# Patient Record
Sex: MALE | Race: ASIAN | NOT HISPANIC OR LATINO | Employment: STUDENT | URBAN - METROPOLITAN AREA
[De-identification: names, ages, dates, MRNs, and addresses within clinical notes are randomized per-mention and may not be internally consistent; named-entity substitution may affect disease eponyms.]

---
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
## 2022-08-12 ENCOUNTER — OFFICE VISIT (OUTPATIENT)
Dept: FAMILY MEDICINE | Facility: CLINIC | Age: 22
End: 2022-08-12
Payer: COMMERCIAL

## 2022-08-12 VITALS
TEMPERATURE: 97.4 F | DIASTOLIC BLOOD PRESSURE: 68 MMHG | SYSTOLIC BLOOD PRESSURE: 110 MMHG | RESPIRATION RATE: 14 BRPM | BODY MASS INDEX: 21.24 KG/M2 | WEIGHT: 124.4 LBS | OXYGEN SATURATION: 98 % | HEART RATE: 70 BPM | HEIGHT: 64 IN

## 2022-08-12 DIAGNOSIS — Z00.00 HEALTHCARE MAINTENANCE: Primary | ICD-10-CM

## 2022-08-12 LAB — HGB BLD-MCNC: 14.9 G/DL (ref 13.3–17.7)

## 2022-08-12 PROCEDURE — 83721 ASSAY OF BLOOD LIPOPROTEIN: CPT | Performed by: FAMILY MEDICINE

## 2022-08-12 PROCEDURE — 99395 PREV VISIT EST AGE 18-39: CPT | Performed by: FAMILY MEDICINE

## 2022-08-12 PROCEDURE — 36415 COLL VENOUS BLD VENIPUNCTURE: CPT | Performed by: FAMILY MEDICINE

## 2022-08-12 PROCEDURE — 82565 ASSAY OF CREATININE: CPT | Performed by: FAMILY MEDICINE

## 2022-08-12 PROCEDURE — 82947 ASSAY GLUCOSE BLOOD QUANT: CPT | Performed by: FAMILY MEDICINE

## 2022-08-12 PROCEDURE — 85018 HEMOGLOBIN: CPT | Performed by: FAMILY MEDICINE

## 2022-08-12 SDOH — ECONOMIC STABILITY: INCOME INSECURITY: IN THE LAST 12 MONTHS, WAS THERE A TIME WHEN YOU WERE NOT ABLE TO PAY THE MORTGAGE OR RENT ON TIME?: NO

## 2022-08-12 ASSESSMENT — PAIN SCALES - GENERAL: PAINLEVEL: NO PAIN (0)

## 2022-08-12 NOTE — PROGRESS NOTES
"SUBJECTIVE:   CC: Jamarcus Childress is an 21 year old male who presents for preventative health visit.     {Split Bill scripting  The purpose of this visit is to discuss your medical history and prevent health problems before you are sick. You may be responsible for a co-pay, coinsurance, or deductible if your visit today includes services such as checking on a sore throat, having an x-ray or lab test, or treating and evaluating a new or existing condition :580359}  {Patient advised of split billing (Optional):154680}  HPI  {Add if <65 person on Medicare  - Required Questions (Optional):362733}  {Outside tests to abstract? :233924}    {additional problems to add (Optional):399536}    Today's PHQ-2 Score:   PHQ-2 ( 1999 Pfizer) 4/9/2021   Q1: Little interest or pleasure in doing things 0   Q2: Feeling down, depressed or hopeless 0   PHQ-2 Score 0   PHQ-2 Total Score (12-17 Years)- Positive if 3 or more points; Administer PHQ-A if positive 0       Abuse: Current or Past(Physical, Sexual or Emotional)- { :653010}  Do you feel safe in your environment? { :443050}        Social History     Tobacco Use     Smoking status: Never Smoker     Smokeless tobacco: Never Used   Substance Use Topics     Alcohol use: No     Alcohol/week: 0.0 standard drinks     {Rooming Staff- Complete this question if Prescreen response is not shown below for today's visit. If you drink alcohol do you typically have >3 drinks per day or >7 drinks per week? (Optional):431584}    No flowsheet data found.{add AUDIT responses (Optional) (A score of 7 for adult men is an indication of hazardous drinking; a score of 8 or more is an indication of an alcohol use disorder.  A score of 7 or more for adult women is an indication of hazardous drinking or an alchohol use disorder):565135}    Last PSA: No results found for: PSA    Reviewed orders with patient. Reviewed health maintenance and updated orders accordingly - { :470295::\"Yes\"}  {Chronicprobdata " "(optional):568145}    Reviewed and updated as needed this visit by clinical staff                    Reviewed and updated as needed this visit by Provider                   {HISTORY OPTIONS (Optional):207216}    Review of Systems  {MALE ROS (Optional):462618::\"CONSTITUTIONAL: NEGATIVE for fever, chills, change in weight\",\"INTEGUMENTARY/SKIN: NEGATIVE for worrisome rashes, moles or lesions\",\"EYES: NEGATIVE for vision changes or irritation\",\"ENT: NEGATIVE for ear, mouth and throat problems\",\"RESP: NEGATIVE for significant cough or SOB\",\"CV: NEGATIVE for chest pain, palpitations or peripheral edema\",\"GI: NEGATIVE for nausea, abdominal pain, heartburn, or change in bowel habits\",\" male: negative for dysuria, hematuria, decreased urinary stream, erectile dysfunction, urethral discharge\",\"MUSCULOSKELETAL: NEGATIVE for significant arthralgias or myalgia\",\"NEURO: NEGATIVE for weakness, dizziness or paresthesias\",\"PSYCHIATRIC: NEGATIVE for changes in mood or affect\"}    OBJECTIVE:   There were no vitals taken for this visit.    Physical Exam  {Exam Choices (Optional):590380}    {Diagnostic Test Results (Optional):975610::\"Diagnostic Test Results:\",\"Labs reviewed in Epic\"}    ASSESSMENT/PLAN:   {Diag Picklist:793545}    {Patient advised of split billing (Optional):900133}    COUNSELING:   {MALE COUNSELING MESSAGES:445009::\"Reviewed preventive health counseling, as reflected in patient instructions\"}    Estimated body mass index is 21.01 kg/m  as calculated from the following:    Height as of 4/9/21: 1.626 m (5' 4\").    Weight as of 4/9/21: 55.5 kg (122 lb 6.4 oz).     {Weight Management Plan (ACO) Complete if BMI is abnormal-  Ages 18-64  BMI >24.9.  Age 65+ with BMI <23 or >30 (Optional):468191}    He reports that he has never smoked. He has never used smokeless tobacco.      Counseling Resources:  ATP IV Guidelines  Pooled Cohorts Equation Calculator  FRAX Risk Assessment  ICSI Preventive Guidelines  Dietary Guidelines " for Americans, 2010  USDA's MyPlate  ASA Prophylaxis  Lung CA Screening    Amish Hill MD  [facility]

## 2022-08-12 NOTE — LETTER
August 15, 2022      Jamarcus Childress  [address] [facility] [address]        Dear ,    We are writing to inform you of your test results.    You maybe able to check the lab results via [employer], it showed your lab results including LDL cholesterol/glucose to rule out diabetes/creatinine for renal function/Hemoglobin to rule out anemia were all normal.   Please stay on top of your current health status and recheck fasting lab at your next physical exam.       Resulted Orders   Hemoglobin   Result Value Ref Range    Hemoglobin 14.9 13.3 - 17.7 g/dL   Glucose   Result Value Ref Range    Glucose 82 70 - 99 mg/dL    Patient Fasting > 8hrs? No    Creatinine   Result Value Ref Range    Creatinine 0.74 0.50 - 1.00 mg/dL    GFR Estimate >90 >60 mL/min/1.73m2      Comment:      Effective December 21, 2021 eGFRcr in adults is calculated using the 2021 CKD-EPI creatinine equation which includes age and gender (Alvin et al., NEJ, DOI: 10.1056/ILPCxu1471779)   LDL cholesterol direct   Result Value Ref Range    LDL Cholesterol Direct 109 <110 mg/dL      Comment:      Age 2-19 years:  Desirable: 0-110 mg/dL   Borderline high: 110-129 mg/dL   High: >= 130 mg/dL    Age 20 years and older:  Desirable: <100mg/dL  Above desirable: 100-129 mg/dL   Borderline high: 130-159 mg/dL   High: 160-189 mg/dL   Very high: >= 190 mg/dL       If you have any questions or concerns, please call the clinic at the number listed above.       Sincerely,      Amish Hill MD

## 2022-08-12 NOTE — PROGRESS NOTES
SUBJECTIVE:   CC: Jamarcus Childress is an 21 year old male who presents for preventative health visit.       Patient has been advised of split billing requirements and indicates understanding: Yes  Healthy Habits:   PHQ-2 Total Score: 0      Today's PHQ-2 Score:   PHQ-2 ( 1999 Pfizer) 8/12/2022   Q1: Little interest or pleasure in doing things 0   Q2: Feeling down, depressed or hopeless 0   PHQ-2 Score 0   PHQ-2 Total Score (12-17 Years)- Positive if 3 or more points; Administer PHQ-A if positive -   Q1: Little interest or pleasure in doing things Not at all   Q2: Feeling down, depressed or hopeless Not at all   PHQ-2 Score 0       Abuse: Current or Past(Physical, Sexual or Emotional)- No  Do you feel safe in your environment? Yes        Social History     Tobacco Use     Smoking status: Never Smoker     Smokeless tobacco: Never Used   Substance Use Topics     Alcohol use: No     Alcohol/week: 0.0 standard drinks     If you drink alcohol do you typically have >3 drinks per day or >7 drinks per week? No    No flowsheet data found.    Last PSA: No results found for: PSA    Reviewed orders with patient. Reviewed health maintenance and updated orders accordingly - Yes  BP Readings from Last 3 Encounters:   08/12/22 110/68   04/09/21 110/66   08/28/19 94/54 (4 %, Z = -1.75 /  18 %, Z = -0.92)*     *BP percentiles are based on the 2017 AAP Clinical Practice Guideline for boys    Wt Readings from Last 3 Encounters:   08/12/22 56.4 kg (124 lb 6.4 oz) (6 %, Z= -1.54)*   04/09/21 55.5 kg (122 lb 6.4 oz) (7 %, Z= -1.45)*   08/28/19 52.2 kg (115 lb) (8 %, Z= -1.41)*     * Growth percentiles are based on CDC (Boys, 2-20 Years) data.                  There is no problem list on file for this patient.    No past surgical history on file.    Social History     Tobacco Use     Smoking status: Never Smoker     Smokeless tobacco: Never Used   Substance Use Topics     Alcohol use: No     Alcohol/week: 0.0 standard drinks     Family History  "  Problem Relation Age of Onset     Family History Negative Mother      Family History Negative Father          Current Outpatient Medications   Medication Sig Dispense Refill     EPINEPHrine (EPIPEN/ADRENACLICK/OR ANY BX GENERIC EQUIV) 0.3 MG/0.3ML injection 2-pack Inject 0.3 mg into the muscle       Allergies   Allergen Reactions     Unknown [No Clinical Screening - See Comments] Rash     Recent Labs   Lab Test 04/09/21  1343   *        Reviewed and updated as needed this visit by clinical staff                    Reviewed and updated as needed this visit by Provider                   No past medical history on file.   No past surgical history on file.    Review of Systems  CONSTITUTIONAL: NEGATIVE for fever, chills, change in weight  INTEGUMENTARY/SKIN: NEGATIVE for worrisome rashes, moles or lesions  EYES: NEGATIVE for vision changes or irritation  ENT: NEGATIVE for ear, mouth and throat problems  RESP: NEGATIVE for significant cough or SOB  CV: NEGATIVE for chest pain, palpitations or peripheral edema  GI: NEGATIVE for nausea, abdominal pain, heartburn, or change in bowel habits   male: negative for dysuria, hematuria, decreased urinary stream, erectile dysfunction, urethral discharge  MUSCULOSKELETAL: NEGATIVE for significant arthralgias or myalgia  NEURO: NEGATIVE for weakness, dizziness or paresthesias  PSYCHIATRIC: NEGATIVE for changes in mood or affect    OBJECTIVE:   /68   Pulse 70   Temp 97.4  F (36.3  C)   Resp 14   Ht 1.635 m (5' 4.37\")   Wt 56.4 kg (124 lb 6.4 oz)   SpO2 98%   BMI 21.11 kg/m      Physical Exam  GENERAL: healthy, alert and no distress  EYES: Eyes grossly normal to inspection, PERRL and conjunctivae and sclerae normal  HENT: ear canals and TM's normal, nose and mouth without ulcers or lesions  NECK: no adenopathy, no asymmetry, masses, or scars and thyroid normal to palpation  RESP: lungs clear to auscultation - no rales, rhonchi or wheezes  CV: regular rate and " "rhythm, normal S1 S2, no S3 or S4, no murmur, click or rub, no peripheral edema and peripheral pulses strong  ABDOMEN: soft, nontender, no hepatosplenomegaly, no masses and bowel sounds normal  MS: no gross musculoskeletal defects noted, no edema  SKIN: no suspicious lesions or rashes  NEURO: Normal strength and tone, mentation intact and speech normal  BACK: no CVA tenderness, no paralumbar tenderness  PSYCH: mentation appears normal, affect normal/bright  LYMPH: no cervical, supraclavicular, axillary, or inguinal adenopathy        ASSESSMENT/PLAN:   (Z00.00) Healthcare maintenance  (primary encounter diagnosis)  Plan: Hemoglobin, Glucose, LDL cholesterol direct,         Creatinine              Patient has been advised of split billing requirements and indicates understanding: Yes    COUNSELING:   Reviewed preventive health counseling, as reflected in patient instructions    Estimated body mass index is 21.01 kg/m  as calculated from the following:    Height as of 4/9/21: 1.626 m (5' 4\").    Weight as of 4/9/21: 55.5 kg (122 lb 6.4 oz).         He reports that he has never smoked. He has never used smokeless tobacco.      Counseling Resources:  ATP IV Guidelines  Pooled Cohorts Equation Calculator  FRAX Risk Assessment  ICSI Preventive Guidelines  Dietary Guidelines for Americans, 2010  USDA's MyPlate  ASA Prophylaxis  Lung CA Screening    Amish Hill MD  [facility]  "

## 2022-08-13 LAB
CREAT SERPL-MCNC: 0.74 MG/DL (ref 0.5–1)
FASTING STATUS PATIENT QL REPORTED: NO
GFR SERPL CREATININE-BSD FRML MDRD: >90 ML/MIN/1.73M2
GLUCOSE BLD-MCNC: 82 MG/DL (ref 70–99)

## 2022-08-14 LAB — LDLC SERPL DIRECT ASSAY-MCNC: 109 MG/DL

## 2023-07-13 ENCOUNTER — PATIENT OUTREACH (OUTPATIENT)
Dept: CARE COORDINATION | Facility: CLINIC | Age: 23
End: 2023-07-13
Payer: COMMERCIAL

## 2023-07-27 ENCOUNTER — PATIENT OUTREACH (OUTPATIENT)
Dept: CARE COORDINATION | Facility: CLINIC | Age: 23
End: 2023-07-27
Payer: COMMERCIAL

## 2023-08-25 ENCOUNTER — OFFICE VISIT (OUTPATIENT)
Dept: FAMILY MEDICINE | Facility: CLINIC | Age: 23
End: 2023-08-25
Payer: COMMERCIAL

## 2023-08-25 VITALS
OXYGEN SATURATION: 99 % | HEIGHT: 64 IN | SYSTOLIC BLOOD PRESSURE: 100 MMHG | HEART RATE: 88 BPM | DIASTOLIC BLOOD PRESSURE: 60 MMHG | BODY MASS INDEX: 20.85 KG/M2 | RESPIRATION RATE: 16 BRPM | WEIGHT: 122.13 LBS | TEMPERATURE: 97.5 F

## 2023-08-25 DIAGNOSIS — Z00.00 HEALTH MAINTENANCE EXAMINATION: Primary | ICD-10-CM

## 2023-08-25 LAB
FASTING STATUS PATIENT QL REPORTED: NO
GLUCOSE SERPL-MCNC: 98 MG/DL (ref 70–99)
HGB BLD-MCNC: 15.9 G/DL (ref 13.3–17.7)
LDLC SERPL DIRECT ASSAY-MCNC: 112 MG/DL

## 2023-08-25 PROCEDURE — 83721 ASSAY OF BLOOD LIPOPROTEIN: CPT | Performed by: FAMILY MEDICINE

## 2023-08-25 PROCEDURE — 36415 COLL VENOUS BLD VENIPUNCTURE: CPT | Performed by: FAMILY MEDICINE

## 2023-08-25 PROCEDURE — 99395 PREV VISIT EST AGE 18-39: CPT | Performed by: FAMILY MEDICINE

## 2023-08-25 PROCEDURE — 82947 ASSAY GLUCOSE BLOOD QUANT: CPT | Performed by: FAMILY MEDICINE

## 2023-08-25 PROCEDURE — 85018 HEMOGLOBIN: CPT | Performed by: FAMILY MEDICINE

## 2023-08-25 RX ORDER — EPINEPHRINE 0.3 MG/.3ML
0.3 INJECTION SUBCUTANEOUS PRN
Qty: 2 EACH | Status: CANCELLED | OUTPATIENT
Start: 2023-08-25

## 2023-08-25 ASSESSMENT — ENCOUNTER SYMPTOMS
CHILLS: 0
NERVOUS/ANXIOUS: 0
EYE PAIN: 0
WEAKNESS: 0
DIZZINESS: 0
JOINT SWELLING: 0
HEADACHES: 0
CONSTIPATION: 0
SORE THROAT: 0
COUGH: 0
PALPITATIONS: 0
HEARTBURN: 0
HEMATOCHEZIA: 0
DIARRHEA: 0
HEMATURIA: 0
FEVER: 0
SHORTNESS OF BREATH: 0
ARTHRALGIAS: 0
NAUSEA: 0
MYALGIAS: 0
ABDOMINAL PAIN: 0
FREQUENCY: 0
PARESTHESIAS: 0
DYSURIA: 0

## 2023-08-25 ASSESSMENT — PAIN SCALES - GENERAL: PAINLEVEL: NO PAIN (0)

## 2023-08-25 NOTE — LETTER
August 28, 2023      Jamarcus Childress  [address] [facility] [address]        Dear ,    We are writing to inform you of your test results.    Your glucose to rule out diabetes and Hemoglobin to rule out anemia are all normal. However, your LDL is slightly elevated. Please work on life style modification including low fat/carb diet with regular exercise.       Resulted Orders   Hemoglobin   Result Value Ref Range    Hemoglobin 15.9 13.3 - 17.7 g/dL   Glucose   Result Value Ref Range    Glucose 98 70 - 99 mg/dL    Patient Fasting > 8hrs? No    LDL cholesterol direct   Result Value Ref Range    LDL Cholesterol Direct 112 (H) <100 mg/dL      Comment:      Age 2-19 years:  Desirable: 0-110 mg/dL   Borderline high: 110-129 mg/dL   High: >= 130 mg/dL    Age 20 years and older:  Desirable: <100mg/dL  Above desirable: 100-129 mg/dL   Borderline high: 130-159 mg/dL   High: 160-189 mg/dL   Very high: >= 190 mg/dL       If you have any questions or concerns, please call the clinic at the number listed above.       Sincerely,      Amish Hill MD

## 2023-08-25 NOTE — PROGRESS NOTES
SUBJECTIVE:   CC: Jamarcus is an 22 year old who presents for preventative health visit.       8/25/2023    12:54 PM   Additional Questions   Roomed by Nathalia TRACEY       Healthy Habits:     Getting at least 3 servings of Calcium per day:  Yes    Bi-annual eye exam:  Yes    Dental care twice a year:  Yes    Sleep apnea or symptoms of sleep apnea:  None    Diet:  Regular (no restrictions)    Frequency of exercise:  1 day/week    Duration of exercise:  15-30 minutes    Taking medications regularly:  Yes    Medication side effects:  Not applicable    Additional concerns today:  No      Today's PHQ-2 Score:       8/25/2023    12:47 PM   PHQ-2 ( 1999 Pfizer)   Q1: Little interest or pleasure in doing things 0   Q2: Feeling down, depressed or hopeless 0   PHQ-2 Score 0   Q1: Little interest or pleasure in doing things Not at all   Q2: Feeling down, depressed or hopeless Not at all   PHQ-2 Score 0         Social History     Tobacco Use    Smoking status: Never    Smokeless tobacco: Never   Substance Use Topics    Alcohol use: No     Alcohol/week: 0.0 standard drinks of alcohol             8/25/2023    12:47 PM   Alcohol Use   Prescreen: >3 drinks/day or >7 drinks/week? No          No data to display                Last PSA: No results found for: PSA    Reviewed orders with patient. Reviewed health maintenance and updated orders accordingly - Yes  BP Readings from Last 3 Encounters:   08/25/23 100/60   08/12/22 110/68   04/09/21 110/66    Wt Readings from Last 3 Encounters:   08/25/23 55.4 kg (122 lb 2 oz)   08/12/22 56.4 kg (124 lb 6.4 oz) (6 %, Z= -1.54)*   04/09/21 55.5 kg (122 lb 6.4 oz) (7 %, Z= -1.45)*     * Growth percentiles are based on CDC (Boys, 2-20 Years) data.                  There is no problem list on file for this patient.    No past surgical history on file.    Social History     Tobacco Use    Smoking status: Never    Smokeless tobacco: Never   Substance Use Topics    Alcohol use: No     Alcohol/week: 0.0 standard  "drinks of alcohol     Family History   Problem Relation Age of Onset    Family History Negative Mother     Family History Negative Father          No current outpatient medications on file.     Allergies   Allergen Reactions    Unknown [No Clinical Screening - See Comments] Rash     Recent Labs   Lab Test 08/12/22  1311 04/09/21  1343    121*   CR 0.74  --    GFRESTIMATED >90  --         Reviewed and updated as needed this visit by clinical staff   Tobacco   Meds              Reviewed and updated as needed this visit by Provider                 No past medical history on file.   No past surgical history on file.    Review of Systems   Constitutional:  Negative for chills and fever.   HENT:  Negative for congestion, ear pain, hearing loss and sore throat.    Eyes:  Negative for pain and visual disturbance.   Respiratory:  Negative for cough and shortness of breath.    Cardiovascular:  Negative for chest pain, palpitations and peripheral edema.   Gastrointestinal:  Negative for abdominal pain, constipation, diarrhea, heartburn, hematochezia and nausea.   Genitourinary:  Negative for dysuria, frequency, genital sores, hematuria, impotence, penile discharge and urgency.   Musculoskeletal:  Negative for arthralgias, joint swelling and myalgias.   Skin:  Negative for rash.   Neurological:  Negative for dizziness, weakness, headaches and paresthesias.   Psychiatric/Behavioral:  Negative for mood changes. The patient is not nervous/anxious.          OBJECTIVE:   /60 (BP Location: Right arm, Patient Position: Sitting, Cuff Size: Adult Regular)   Pulse 88   Temp 97.5  F (36.4  C) (Temporal)   Resp 16   Ht 1.636 m (5' 4.41\")   Wt 55.4 kg (122 lb 2 oz)   SpO2 99%   BMI 20.70 kg/m      Physical Exam  GENERAL: healthy, alert and no distress  EYES: Eyes grossly normal to inspection, PERRL and conjunctivae and sclerae normal  HENT: ear canals and TM's normal, nose and mouth without ulcers or lesions  NECK: no " adenopathy, no asymmetry, masses, or scars and thyroid normal to palpation  RESP: lungs clear to auscultation - no rales, rhonchi or wheezes  CV: regular rate and rhythm, normal S1 S2, no S3 or S4, no murmur, click or rub, no peripheral edema and peripheral pulses strong  ABDOMEN: soft, nontender, no hepatosplenomegaly, no masses and bowel sounds normal  MS: no gross musculoskeletal defects noted, no edema  SKIN: no suspicious lesions or rashes  NEURO: Normal strength and tone, mentation intact and speech normal  BACK: no CVA tenderness, no paralumbar tenderness        ASSESSMENT/PLAN:       ICD-10-CM    1. Health maintenance examination  Z00.00 Hemoglobin     Glucose     LDL cholesterol direct          Patient has been advised of split billing requirements and indicates understanding: Yes      COUNSELING:   Reviewed preventive health counseling, as reflected in patient instructions        He reports that he has never smoked. He has never used smokeless tobacco.            Amish Hill MD  [facility]

## 2024-08-26 ENCOUNTER — OFFICE VISIT (OUTPATIENT)
Dept: FAMILY MEDICINE | Facility: CLINIC | Age: 24
End: 2024-08-26
Payer: COMMERCIAL

## 2024-08-26 VITALS
TEMPERATURE: 97.5 F | OXYGEN SATURATION: 97 % | HEIGHT: 64 IN | HEART RATE: 89 BPM | SYSTOLIC BLOOD PRESSURE: 118 MMHG | RESPIRATION RATE: 14 BRPM | BODY MASS INDEX: 21.51 KG/M2 | WEIGHT: 126 LBS | DIASTOLIC BLOOD PRESSURE: 82 MMHG

## 2024-08-26 DIAGNOSIS — Z00.00 HEALTH MAINTENANCE EXAMINATION: Primary | ICD-10-CM

## 2024-08-26 LAB
FASTING STATUS PATIENT QL REPORTED: YES
GLUCOSE SERPL-MCNC: 98 MG/DL (ref 70–99)
HGB BLD-MCNC: 15.4 G/DL (ref 13.3–17.7)
LDLC SERPL DIRECT ASSAY-MCNC: 119 MG/DL

## 2024-08-26 PROCEDURE — 90472 IMMUNIZATION ADMIN EACH ADD: CPT | Performed by: FAMILY MEDICINE

## 2024-08-26 PROCEDURE — 36415 COLL VENOUS BLD VENIPUNCTURE: CPT | Performed by: FAMILY MEDICINE

## 2024-08-26 PROCEDURE — 90715 TDAP VACCINE 7 YRS/> IM: CPT | Performed by: FAMILY MEDICINE

## 2024-08-26 PROCEDURE — 90471 IMMUNIZATION ADMIN: CPT | Performed by: FAMILY MEDICINE

## 2024-08-26 PROCEDURE — 99395 PREV VISIT EST AGE 18-39: CPT | Mod: 25 | Performed by: FAMILY MEDICINE

## 2024-08-26 PROCEDURE — 83721 ASSAY OF BLOOD LIPOPROTEIN: CPT | Performed by: FAMILY MEDICINE

## 2024-08-26 PROCEDURE — 82947 ASSAY GLUCOSE BLOOD QUANT: CPT | Performed by: FAMILY MEDICINE

## 2024-08-26 PROCEDURE — 90651 9VHPV VACCINE 2/3 DOSE IM: CPT | Performed by: FAMILY MEDICINE

## 2024-08-26 PROCEDURE — 85018 HEMOGLOBIN: CPT | Performed by: FAMILY MEDICINE

## 2024-08-26 SDOH — HEALTH STABILITY: PHYSICAL HEALTH: ON AVERAGE, HOW MANY DAYS PER WEEK DO YOU ENGAGE IN MODERATE TO STRENUOUS EXERCISE (LIKE A BRISK WALK)?: 1 DAY

## 2024-08-26 ASSESSMENT — SOCIAL DETERMINANTS OF HEALTH (SDOH): HOW OFTEN DO YOU GET TOGETHER WITH FRIENDS OR RELATIVES?: MORE THAN THREE TIMES A WEEK

## 2024-08-26 ASSESSMENT — PAIN SCALES - GENERAL: PAINLEVEL: NO PAIN (0)

## 2024-08-26 NOTE — PROGRESS NOTES
Preventive Care Visit  [facility] ELSY iHll MD, Family Medicine  Aug 26, 2024      Assessment & Plan     Health maintenance examination    - Hemoglobin; Future  - Glucose; Future  - LDL cholesterol direct; Future    Patient has been advised of split billing requirements and indicates understanding: Yes        Counseling  Appropriate preventive services were addressed with this patient via screening, questionnaire, or discussion as appropriate for fall prevention, nutrition, physical activity, Tobacco-use cessation, social engagement, weight loss and cognition.  Checklist reviewing preventive services available has been given to the patient.  Reviewed patient's diet, addressing concerns and/or questions.   He is at risk for lack of exercise and has been provided with information to increase physical activity for the benefit of his well-being.   He is at risk for psychosocial distress and has been provided with information to reduce risk.       FUTURE APPOINTMENTS:       - Follow-up visit in 1 year     Cindy Ricketts is a 23 year old, presenting for the following:  Physical        8/26/2024     9:18 AM   Additional Questions   Roomed by Aml        Health Care Directive  Patient does not have a Health Care Directive or Living Will: Patient states has Advance Directive and will bring in a copy to clinic.    HPI          8/26/2024   General Health   How would you rate your overall physical health? Good   Feel stress (tense, anxious, or unable to sleep) Only a little      (!) STRESS CONCERN      8/26/2024   Nutrition   Three or more servings of calcium each day? (!) I DON'T KNOW   Diet: Regular (no restrictions)   How many servings of fruit and vegetables per day? (!) 2-3   How many sweetened beverages each day? 0-1            8/26/2024   Exercise   Days per week of moderate/strenous exercise 1 day      (!) EXERCISE CONCERN      8/26/2024   Social Factors   Frequency of gathering with  friends or relatives More than three times a week   Worry food won't last until get money to buy more No   Food not last or not have enough money for food? No   Do you have housing? (Housing is defined as stable permanent housing and does not include staying ouside in a car, in a tent, in an abandoned building, in an overnight shelter, or couch-surfing.) Yes   Are you worried about losing your housing? No   Lack of transportation? No   Unable to get utilities (heat,electricity)? No            8/26/2024   Dental   Dentist two times every year? Yes            8/26/2024   TB Screening   Were you born outside of the [place]? Yes            Today's PHQ-2 Score:       8/26/2024     9:13 AM   PHQ-2 ( 1999 [employer])   PHQ-2 Score Incomplete           8/26/2024   Substance Use   Alcohol more than 3/day or more than 7/wk Not Applicable   Do you use any other substances recreationally? No        Social History     Tobacco Use    Smoking status: Never    Smokeless tobacco: Never   Substance Use Topics    Alcohol use: No     Alcohol/week: 0.0 standard drinks of alcohol    Drug use: No             8/26/2024   One time HIV Screening   Previous HIV test? No          8/26/2024   STI Screening   New sexual partner(s) since last STI/HIV test? No            8/26/2024   Contraception/Family Planning   Questions about contraception or family planning No           Reviewed and updated as needed this visit by Provider                    No past medical history on file.  No past surgical history on file.  Labs reviewed in EPIC  BP Readings from Last 3 Encounters:   08/26/24 118/82   08/25/23 100/60   08/12/22 110/68    Wt Readings from Last 3 Encounters:   08/26/24 57.2 kg (126 lb)   08/25/23 55.4 kg (122 lb 2 oz)   08/12/22 56.4 kg (124 lb 6.4 oz) (6%, Z= -1.54)*     * Growth percentiles are based on CDC (Boys, 2-20 Years) data.                  There is no problem list on file for this patient.    No past surgical history on file.    Social  "History     Tobacco Use    Smoking status: Never    Smokeless tobacco: Never   Substance Use Topics    Alcohol use: No     Alcohol/week: 0.0 standard drinks of alcohol     Family History   Problem Relation Age of Onset    Family History Negative Mother     Family History Negative Father          No current outpatient medications on file.     Allergies   Allergen Reactions    Unknown [No Clinical Screening - See Comments] Rash     Recent Labs   Lab Test 08/25/23  1331 08/12/22  1311 04/09/21  1343   * 109 121*   CR  --  0.74  --    GFRESTIMATED  --  >90  --           Review of Systems  Constitutional, HEENT, cardiovascular, pulmonary, GI, , musculoskeletal, neuro, skin, endocrine and psych systems are negative, except as otherwise noted.     Objective    Exam  /82   Pulse 89   Temp 97.5  F (36.4  C) (Temporal)   Resp 14   Ht 1.626 m (5' 4\")   Wt 57.2 kg (126 lb)   SpO2 97%   BMI 21.63 kg/m     Estimated body mass index is 21.63 kg/m  as calculated from the following:    Height as of this encounter: 1.626 m (5' 4\").    Weight as of this encounter: 57.2 kg (126 lb).    Physical Exam  GENERAL: alert and no distress  EYES: Eyes grossly normal to inspection, PERRL and conjunctivae and sclerae normal  HENT: ear canals and TM's normal, nose and mouth without ulcers or lesions  NECK: no adenopathy, no asymmetry, masses, or scars  RESP: lungs clear to auscultation - no rales, rhonchi or wheezes  CV: regular rate and rhythm, normal S1 S2, no S3 or S4, no murmur, click or rub, no peripheral edema  ABDOMEN: soft, nontender, no hepatosplenomegaly, no masses and bowel sounds normal  MS: no gross musculoskeletal defects noted, no edema  NEURO: Normal strength and tone, mentation intact and speech normal  BACK: no CVA tenderness, no paralumbar tenderness  PSYCH: mentation appears normal, affect normal/bright  LYMPH: no cervical, supraclavicular, axillary, or inguinal adenopathy        Signed Electronically by: " Amish Hill MD

## 2025-07-28 ENCOUNTER — PATIENT OUTREACH (OUTPATIENT)
Dept: CARE COORDINATION | Facility: CLINIC | Age: 25
End: 2025-07-28
Payer: COMMERCIAL